# Patient Record
Sex: MALE | Race: WHITE | Employment: OTHER | URBAN - METROPOLITAN AREA
[De-identification: names, ages, dates, MRNs, and addresses within clinical notes are randomized per-mention and may not be internally consistent; named-entity substitution may affect disease eponyms.]

---

## 2020-07-21 ENCOUNTER — NURSE TRIAGE (OUTPATIENT)
Dept: NURSING | Facility: CLINIC | Age: 75
End: 2020-07-21

## 2020-07-21 DIAGNOSIS — Z11.59 SCREENING FOR VIRAL DISEASE: Primary | ICD-10-CM

## 2020-07-21 NOTE — TELEPHONE ENCOUNTER
"Patient calls stating he was ill in late January and he was in [place]. Got extremely short of breath and was treated in [place] for the flu.       Patient is calling requesting COVID serologic antibody testing.  NOTE: Serologic testing is a blood test for 'antibodies' which are made at 10-14 days after you have had symptoms of COVID or were exposed and had an asymptomatic infection.  This does NOT test you for 'active' infection or tell you if you are contagious.    Are you a healthcare worker? no  Do you currently have a cough, fever, body aches, shortness of breath, or difficulty breathing?  No  Did you previously have cough, fever, body aches, shortness of breath, or difficulty breathing that have now resolved? Has had previous covid symptoms.   Symptoms began 175 days ago.  Symptoms started > 14 days ago. Lab order placed per SARS-CoV-2 Serology test Standing Order using indication \"Previously symptomatic >14d since onset, currently asymptomatic\" and diagnosis code \"Screening for viral disease\" (Z11.59)      The patient was informed: \"Testing is limited each day and it may take time for testing to be available to everyone who has called. You will receive a call within 48-72 hours to schedule the serology testing. Please confirm the best number to reach you is [phone]. If you have any questions about scheduling, call [phone].\"     SRAVANTHI Rowley RN      "

## 2020-08-05 ENCOUNTER — TELEPHONE (OUTPATIENT)
Dept: EMERGENCY MEDICINE | Facility: CLINIC | Age: 75
End: 2020-08-05

## 2020-08-05 DIAGNOSIS — Z11.59 SCREENING FOR VIRAL DISEASE: ICD-10-CM

## 2020-08-05 PROCEDURE — 86769 SARS-COV-2 COVID-19 ANTIBODY: CPT | Performed by: EMERGENCY MEDICINE

## 2020-08-05 PROCEDURE — 36415 COLL VENOUS BLD VENIPUNCTURE: CPT | Performed by: EMERGENCY MEDICINE

## 2020-08-05 NOTE — TELEPHONE ENCOUNTER
Patient was requesting a call back on Serology testing False-Positive and False-Negatives numbers.  The latest information I could find was Sensitivity and Specificity to be 100% in patients that had symptoms greater than 14 days ago.  This would be no false positives and no false negatives.  There are very rare cases where there is a false positive due to cross reactivity with antibodies that are similar, but this is a very small number.    Thanks,  [facility]

## 2020-08-07 LAB
COVID-19 SPIKE RBD ABY TITER: NORMAL
COVID-19 SPIKE RBD ABY: NEGATIVE

## 2020-09-23 ENCOUNTER — NURSE TRIAGE (OUTPATIENT)
Dept: NURSING | Facility: CLINIC | Age: 75
End: 2020-09-23

## 2020-09-23 NOTE — TELEPHONE ENCOUNTER
Patient calling - says he had a COVID19 antibody test in August and never received results.  Result letter from 8/8/2020 reviewed with patient.  Transferred to medical records to have results letter re-mailed to patient.    Danielle Corbin RN  Triage Nurse Advisor       Reason for Disposition    Caller requesting lab results    Protocols used: PCP CALL - NO TRIAGE-A-